# Patient Record
(demographics unavailable — no encounter records)

---

## 2025-06-23 NOTE — HISTORY OF PRESENT ILLNESS
[FreeTextEntry1] : Reason for visit---Chronic stable HIV changing and CAD ( 20 minutes on todays visit)  67year old male . Had JUAN in . Diagnosed with HIV at [facility]. Followed by Sai Linares --now moved to [place]. Then saw Dr. Duffy and the Dr. Jackson, Too far distance now here . Lives in [place]. Taking Biktarvy at Present and concerned over low viremia. --wife negative. One child 38 . non smoker ---quit in , non drinker PMH---cardiac---elevated lipids. has cards Dr. Adames-at \[facility]\"" --has cardiac surgery at [facility] two stents. and mitral valve replacement. HIV', born with left club foot. family---father  of MI, grandfather had diabetes, mother passed away at 95 [address] Chester Bryant is his PCP Both hips due to wear and tear and origina; club foot. , low back fusion ---started after fall slipping on stairs. total reverse replacement shoulder surgery retired. NKA medication Biktarvy, ASA 81 mg , Crestor , azetimide, vitamin D 3  plan------Chronic stable HIV changing and chronic CAD ( 20 minutes on todays visit)  1) reviewed previous labs today all labs today --- 2) external provider notes  reviewed 3) and see in 6 month 4) renewed Biktarvy   Active Problems Hammer toe of left foot (735.4) (M20.42) Hammer toe of right foot (735.4) (M20.41) Onychomycosis (110.1) (B35.1) Pain due to right shoulder joint prosthesis (996.77,719.41,V43.61) (T84.84XA,Z96.611) Pain of toe of left foot (729.5) (M79.675) Pain of toe of right foot (729.5) (M79.674)        Past Medical History Unreviewed Unverified: History of complete eye exam (V1.) (Z92.89) Unreviewed Unverified: History of dental examination () (Z92.89) History of Dystrophia unguium (703.8) (L60.3) History of HIV (human immunodeficiency virus infection) (V08) (Z21)        Surgical History History of Hip replacement History of Shoulder replacement        Social History Exercises occasionally (V49.89) (Z78.9) Former smoker (V15.82) (Z87.891)        Current Meds Payam Low Dose 81 MG Oral Tablet Delayed Release Biktarvy -25 MG Oral Tablet Diclofenac Sodium 1 % GEL Ezetimibe 10 MG Oral Tablet Imiquimod 5 % External Cream Jublia 10 % External Solution Meloxicam 15 MG Oral Tablet Mupirocin 2 % External Ointment Naproxen 250 MG Oral Tablet Prasugrel HCl - 10 MG Oral Tablet Rosuvastatin Calcium 40 MG Oral Tablet traZODone HCl - 50 MG Oral Tablet        Allergies No Known Drug Allergies

## 2025-06-23 NOTE — ASSESSMENT
[FreeTextEntry1] : plan------Chronic stable HIV changing and chronic CAD ( 20 minutes on todays visit)  1) reviewed previous labs today all labs today --- 2) external provider notes  reviewed 3) and see in 6 month 4) renewed Biktarvy   [Treatment Education] : treatment education [Treatment Adherence] : treatment adherence [Drug Interactions / Side Effects] : drug interactions/side effects [HIV Education] : HIV Education [Anticipatory Guidance] : anticipatory guidance

## 2025-07-30 NOTE — ASSESSMENT
[FreeTextEntry1] : Plan: Counselingmetatarsalgia   The initial treatment for metatarsalgia is conservative.  Exercise to reduce body weight and appropriate shoe wear and inserts are recommended.  NSAIDs and steroid injections may also be helpful.  Depending on the cause of the metatarsalgia, surgery may be recommended especially if there is toe deformity (I. E.,  Bunion or hammertoe) that has not responded to conservative management. Metatarsalgia is a symptom characterized by pain in the ball of the forefoot.  The pain usually increases when barefoot or when walking on hard surfaces.  There are many underlying causes including toe deformities, stress fractures, being overweight, and poor fitting shoes.  High impact sports may put individuals at increased risk of developing this pain.  Outcomes after treatment for metatarsalgia are generally good, however, a subset of patients will have chronic symptoms.   Contact office if pain worsens, if you develop numbness or tingling in your foot, or if you notice the color of your toes is blue or gray.   Plan: Footwear recommendations   The following footwear recommendations have been made: Recommend sturdy shoes or sneakers with adequate heel counter support and stiff soles.  The following footwear recommendations have been made, Rocker-bottom footwear.   The following footwear recommendations have been made: Sneakers with adequate arch support.   Plan: Counseling custom orthotic   The benefits of custom functional orthotics were thoroughly reviewed with the patient today.  Biomechanical optimization based on patient's foot type and morphologies is medically necessary.  The severity of the foot condition is such that a lesser means, for example, inlay shoes, shoe modifications, etc., cannot adduct greatly compensate for the deformity or if there is a leg discrepancy length of at least 1.5 inches or greater   The expectations of the orthosis were discussed and the patient's questions and concerns were addressed to their satisfaction.   Contact our office for any questions or concerns regarding custom functional orthotics.   Orthotics will go to the sulcus we will use intrinsic posting only so patient can use orthotics in various types of shoes and sneakers.   Plan: Prescription medication management   Dispensed pads and demonstrated usage of: Dispensed felt padding with U pads.   Plan: Additional Notes   Patient specific notes: We will take a conservative course of care in alleviating patient's metatarsalgia if pain continues we may recommend x-rays and possible MRI.   Plan: Counseling fat pad atrophy   I counseled the patient regarding the following:   Treatment: Fat pad atrophy is treated conservatively with footwear modifications and insoles.  Often times, the condition is advanced and functional orthotics are needed.  Cushioning socks can also help reduce the impact of walking on the foot.  Biomechanics reviewed.   Expectations: Most individuals note that fat pad atrophy evolves over time.  This condition can progressively worsen if not managed properly.  Fat pad atrophy can cause pain while walking and footwear modifications with insoles are in imperative part of management.  This condition is mostly permanent. Fungal toenails were debrided using a double-action nail clipper a curette and an electrical file. After debridement the nails were cleaned with alcohol.  The patient had much less pain on palpation of the nails after debridement was performed.  Due to the severe thickening and onychomycotic nature of the involved toenails the patient understands patient not to trim their own nails.  We discussed treatment options for treating nail fungus using oral medication topical medication and laser.  Part of appropriate treatment will be continued debridement of the nails every few months.  All questions answered and reviewed.

## 2025-07-30 NOTE — PHYSICAL EXAM
[General Appearance - Alert] : alert [General Appearance - In No Acute Distress] : in no acute distress [General Appearance - Well Nourished] : well nourished [General Appearance - Well Developed] : well developed [Ankle Swelling (On Exam)] : present [Ankle Swelling Bilaterally] : bilaterally  [Varicose Veins Of Lower Extremities] : bilaterally [] : on both lower extremities [Ankle Swelling On The Right] : mild [Delayed in the Right Toes] : capillary refills normal in right toes [Delayed in the Left Toes] : capillary refills normal in the left toes [1+] : left foot posterior tibialis 1+ [2+] : left foot dorsalis pedis 2+ [de-identified] : Patient with cavus foot type causing hammertoe deformities 2 through 4 bilateral. Mild metatarsalgia noted under mets 2-4 both feet. [FreeTextEntry1] : Patient with fungal toenails bilateral.  Some improvement noted however nails are thick yellow and painful in shoes. Hallux nails most onychomycotic.  [Sensation] : the sensory exam was normal to light touch and pinprick [No Focal Deficits] : no focal deficits [Deep Tendon Reflexes (DTR)] : deep tendon reflexes were 2+ and symmetric [Motor Exam] : the motor exam was normal [Diminished Throughout Right Foot] : diminished sensation with monofilament testing throughout right foot [Diminished Throughout Left Foot] : diminished sensation with monofilament testing throughout left foot [Oriented To Time, Place, And Person] : oriented to person, place, and time [Impaired Insight] : insight and judgment were intact [Affect] : the affect was normal

## 2025-07-30 NOTE — PHYSICAL EXAM
[General Appearance - Alert] : alert [General Appearance - In No Acute Distress] : in no acute distress [General Appearance - Well Nourished] : well nourished [General Appearance - Well Developed] : well developed [Ankle Swelling (On Exam)] : present [Ankle Swelling Bilaterally] : bilaterally  [Varicose Veins Of Lower Extremities] : bilaterally [] : on both lower extremities [Ankle Swelling On The Right] : mild [Delayed in the Right Toes] : capillary refills normal in right toes [Delayed in the Left Toes] : capillary refills normal in the left toes [1+] : left foot posterior tibialis 1+ [2+] : left foot dorsalis pedis 2+ [de-identified] : Patient with cavus foot type causing hammertoe deformities 2 through 4 bilateral. Mild metatarsalgia noted under mets 2-4 both feet. [FreeTextEntry1] : Patient with fungal toenails bilateral.  Some improvement noted however nails are thick yellow and painful in shoes. Hallux nails most onychomycotic.  [Sensation] : the sensory exam was normal to light touch and pinprick [No Focal Deficits] : no focal deficits [Deep Tendon Reflexes (DTR)] : deep tendon reflexes were 2+ and symmetric [Motor Exam] : the motor exam was normal [Diminished Throughout Right Foot] : diminished sensation with monofilament testing throughout right foot [Diminished Throughout Left Foot] : diminished sensation with monofilament testing throughout left foot [Oriented To Time, Place, And Person] : oriented to person, place, and time [Impaired Insight] : insight and judgment were intact [Affect] : the affect was normal